# Patient Record
Sex: FEMALE | Race: WHITE
[De-identification: names, ages, dates, MRNs, and addresses within clinical notes are randomized per-mention and may not be internally consistent; named-entity substitution may affect disease eponyms.]

---

## 2017-04-28 NOTE — ED PDOC
Arrival/HPI





- General


Chief Complaint: Abdominal Pain


Time Seen by Provider: 04/28/17 02:09


Historian: Patient





- History of Present Illness


Narrative History of Present Illness (Text): 





04/28/17 02:25


Sergio Hannah is a 41 year old female, with no significant past medical 

history, presents to the emergency department complaining of 2 day duration of 

intermittent epigastric abdominal pain radiating to mid back. States that pain 

presented around mid-night yesterday which resolved around 4 a.m. Reports she 

experienced similar quality pain tonight post dinner around same time. Denies 

fever, chills, chest pain, difficulty breathing, nausea, vomiting, diarrhea, 

urinary symptoms, or any other complaints at this time. 





Time/Duration: < week (2 days )


Symptom Onset: Gradual


Symptom Course: Intermittent


Severity Level: Mild


Activities at Onset: Light


Context: Home





Past Medical History





- Provider Review


Nursing Documentation Reviewed: Yes





- Psychiatric


Hx Substance Use: No





Family/Social History





- Physician Review


Nursing Documentation Reviewed: Yes


Family/Social History: No Known Family HX


Smoking Status: Current Some Days Smoker


Hx Alcohol Use: No


Hx Substance Use: No





Allergies/Home Meds


Allergies/Adverse Reactions: 


Allergies





No Known Allergies Allergy (Verified 04/28/17 02:14)


 











Review of Systems





- Physician Review


All systems were reviewed & negative as marked: Yes





- Review of Systems


Constitutional: Normal.  absent: Fatigue, Fevers


Respiratory: Normal.  absent: SOB, Cough


Cardiovascular: Normal.  absent: Chest Pain


Gastrointestinal: Abdominal Pain (epigastric ).  absent: Diarrhea, Nausea, 

Vomiting


Musculoskeletal: Back Pain


Neurological: Normal.  absent: Headache, Dizziness


Psychiatric: Normal





Physical Exam


Vital Signs Reviewed: Yes


Vital Signs











  Temp Pulse Resp BP Pulse Ox


 


 04/28/17 04:38   78  18  122/73  98


 


 04/28/17 02:15  97.7 F  83  18  161/90 H  100











Temperature: Afebrile


Blood Pressure: Hypertensive


Pulse: Regular


Respiratory Rate: Normal


Appearance: Positive for: Well-Appearing, Non-Toxic, Comfortable


Pain Distress: None


Mental Status: Positive for: Alert and Oriented X 3





- Systems Exam


Head: Present: Atraumatic, Normocephalic


Pupils: Present: PERRL


Conjunctiva: Present: Normal


Respiratory/Chest: Present: Clear to Auscultation, Good Air Exchange.  No: 

Respiratory Distress, Accessory Muscle Use


Cardiovascular: Present: Regular Rate and Rhythm, Normal S1, S2.  No: Murmurs


Abdomen: Present: Tenderness (RUQ tenderness ), Normal Bowel Sounds.  No: 

Distention, Peritoneal Signs


Back: Present: Normal Inspection.  No: CVA Tenderness, Midline Tenderness, 

Paraspinal Tenderness


Upper Extremity: Present: Normal Inspection.  No: Cyanosis, Edema


Lower Extremity: Present: Normal Inspection.  No: Edema


Neurological: Present: GCS=15, CN II-XII Intact, Speech Normal, Motor Func 

Grossly Intact, Normal Sensory Function


Skin: Present: Warm, Dry, Normal Color.  No: Rashes


Psychiatric: Present: Alert, Oriented x 3, Normal Insight, Normal Concentration





Medical Decision Making


ED Course and Treatment: 





04/28/17 03:28


EKG interpreted by me:


NSR @ 75 bpm. Normal axis. No ischemic changes. 





04/28/17 04:27





US Abdomen Limited, Right Upper Quadrant results reviewed: 





FINDINGS:


Liver: Enlarged, 19.2 cm. Fatty infiltration. No mass. No intrahepatic ductal 

dilatation.


Gallbladder: Gallstones. No wall thickening. No pericholecystic fluid. No 

sonographic De Souza's


sign.


Common bile duct: No dilatation. No stones.


Pancreas: Unremarkable as visualized.


Right kidney: Normal echogenicity. No hydronephrosis.





IMPRESSION:


1. Cholelithiasis.


2. Incidental/non-acute findings are described above.








04/28/17 04:37


Patient states she feels much better now and pain has resolved completely. 

Patient now recollects that she had similar quality pain last august after 

eating. She was seen by her PMD at that time and was advised to follow up with 

a surgeon, but she did not. 


Patient is stable for discharge. Advised to followup with PMD within few days 

and present to emergency department for worsening symptoms. 








- Lab Interpretations


Lab Results: 








 04/28/17 02:30 





 04/28/17 02:30 





 Lab Results





04/28/17 02:45: Urine HCG, Qual Negative


04/28/17 02:45: Urine Color Yellow, Urine Appearance Clear, Urine pH 6.0, Ur 

Specific Gravity <= 1.005, Urine Protein Negative, Urine Glucose (UA) Negative, 

Urine Ketones Negative, Urine Blood Moderate H, Urine Nitrate Negative, Urine 

Bilirubin Negative, Urine Urobilinogen 0.2, Ur Leukocyte Esterase Negative, 

Urine RBC 5 - 10, Urine WBC 2 - 5, Ur Epithelial Cells 6 - 8, Urine Bacteria 

Trace


04/28/17 02:30: Sodium 141, Potassium 4.0, Chloride 104, Carbon Dioxide 25, 

Anion Gap 16, BUN 11, Creatinine 0.6, Est GFR (African Amer) > 60, Est GFR (Non-

Af Amer) > 60, Random Glucose 117 H, Calcium 9.6, Total Bilirubin 0.4, AST 24, 

ALT 28, Alkaline Phosphatase 69, Troponin I < 0.01, Total Protein 8.0, Albumin 

4.4, Globulin 3.6, Albumin/Globulin Ratio 1.2, Lipase 160


04/28/17 02:30: WBC 7.9, RBC 4.51, Hgb 12.2, Hct 37.9, MCV 84.0, MCH 27.1, MCHC 

32.2, RDW 13.0, Plt Count 261, MPV 10.6, Gran % 65.4, Lymph % (Auto) 26.2, Mono 

% (Auto) 6.2 H, Eos % (Auto) 1.9, Baso % (Auto) 0.3, Gran # 5.16, Lymph # 2.1, 

Mono # 0.5, Eos # 0.2, Baso # 0.02











- RAD Interpretation


Radiology Orders: 








04/28/17 02:35


GALLBLADDER & COMMON DUCT [US] Stat 














- Medication Orders


Current Medication Orders: 











Discontinued Medications





Ketorolac Tromethamine (Toradol)  10 mg IVP STAT STA


   Stop: 04/28/17 03:29


   Last Admin: 04/28/17 03:37  Dose: 10 mg





Morphine Sulfate (Morphine)  4 mg IVP ONCE PRN


   PRN Reason: Pain, severe (8-10)











- Scribe Statement


The provider has reviewed the documentation as recorded by the [facility] 


Provider Attestation: 








All medical record entries made by the [email or web address] were at my direction and 

personally dictated by me. I have reviewed the chart and agree that the record 

accurately reflects my personal performance of the history, physical exam, 

medical decision making, and the department course for this patient. I have 

also personally directed, reviewed, and agree with the discharge instructions 

and disposition.





Disposition/Present on Arrival





- Present on Arrival


Any Indicators Present on Arrival: No


History of DVT/PE: No


History of Uncontrolled Diabetes: No


Urinary Catheter: No


History of Decub. Ulcer: No


History Surgical Site Infection Following: None





- Disposition


Have Diagnosis and Disposition been Completed?: Yes


Diagnosis: 


 Biliary colic





Disposition: HOME/ ROUTINE


Disposition Time: 04:37


Condition: IMPROVED


Discharge Instructions (ExitCare):  Biliary Colic (ED), Gallstones (ED)


Additional Instructions: 


Please follow up with your doctor today. Return to the ER for any worsening 

symptoms, severe pain, fever, repeated vomiting, change in skin color, or for 

any other concerns. 


Prescriptions: 


Naproxen 500 mg PO Q12H PRN #9 tab


 PRN Reason: Pain, Moderate (4-7)


Referrals: 


Pawan Epperson MD [Family Provider] - Follow up with primary

## 2017-04-28 NOTE — CARD
--------------- APPROVED REPORT --------------





EKG Measurement

Heart [id number]

VA [id number]

[id number]

[id number]

[id number]



<Conclusion>

Normal sinus rhythm

Normal ECG

## 2017-04-28 NOTE — US
EXAM:

  US Abdomen Limited, Right Upper Quadrant



CLINICAL HISTORY:

  41 years old, female; Pain; Abdominal pain; Generalized; Additional info: Ruq 

pain



TECHNIQUE:

  Real-time ultrasound of the right upper quadrant with image documentation.



COMPARISON:

  US - ABDOMEN 8/27/2012 11:12:14 AM



FINDINGS:

  Liver:  Enlarged, 19.2 cm.  Fatty infiltration.  No mass.  No intrahepatic 

ductal dilatation.

  Gallbladder:  Gallstones.  No wall thickening.  No pericholecystic fluid.  No 

sonographic De Souza's sign.

  Common bile duct:  No dilatation.  No stones.

  Pancreas:  Unremarkable as visualized.

  Right kidney:  Normal echogenicity.  No hydronephrosis.



IMPRESSION:     

1.  Cholelithiasis.

2.  Incidental/non-acute findings are described above.

## 2019-02-16 ENCOUNTER — HOSPITAL ENCOUNTER (OUTPATIENT)
Dept: HOSPITAL 42 - RAD | Age: 44
End: 2019-02-16
Payer: MEDICAID